# Patient Record
Sex: MALE | Race: WHITE | NOT HISPANIC OR LATINO | Employment: OTHER | URBAN - METROPOLITAN AREA
[De-identification: names, ages, dates, MRNs, and addresses within clinical notes are randomized per-mention and may not be internally consistent; named-entity substitution may affect disease eponyms.]

---

## 2018-02-22 ENCOUNTER — OFFICE VISIT (OUTPATIENT)
Dept: OPHTHALMOLOGY | Facility: CLINIC | Age: 68
End: 2018-02-22
Payer: MEDICARE

## 2018-02-22 DIAGNOSIS — H35.3132 INTERMEDIATE STAGE NONEXUDATIVE AGE-RELATED MACULAR DEGENERATION OF BOTH EYES: ICD-10-CM

## 2018-02-22 DIAGNOSIS — H25.12 NUCLEAR SCLEROSIS OF LEFT EYE: ICD-10-CM

## 2018-02-22 DIAGNOSIS — Z96.1 PSEUDOPHAKIA OF RIGHT EYE: Primary | ICD-10-CM

## 2018-02-22 DIAGNOSIS — H52.203 ASTIGMATISM OF BOTH EYES, UNSPECIFIED TYPE: ICD-10-CM

## 2018-02-22 PROCEDURE — 92134 CPTRZ OPH DX IMG PST SGM RTA: CPT | Mod: PBBFAC,PO | Performed by: OPHTHALMOLOGY

## 2018-02-22 PROCEDURE — 99999 PR PBB SHADOW E&M-NEW PATIENT-LVL I: CPT | Mod: PBBFAC,,, | Performed by: OPHTHALMOLOGY

## 2018-02-22 PROCEDURE — 92004 COMPRE OPH EXAM NEW PT 1/>: CPT | Mod: S$PBB,,, | Performed by: OPHTHALMOLOGY

## 2018-02-22 PROCEDURE — 99201 *HC E&M-NEW PATIENT-LVL I: CPT | Mod: PBBFAC,PO,25 | Performed by: OPHTHALMOLOGY

## 2018-02-22 RX ORDER — PRIMIDONE 50 MG/1
TABLET ORAL
COMMUNITY

## 2018-02-22 RX ORDER — METOPROLOL SUCCINATE 100 MG/1
100 TABLET, EXTENDED RELEASE ORAL DAILY
COMMUNITY
End: 2019-01-01

## 2018-02-22 RX ORDER — PAROXETINE 10 MG/5ML
SUSPENSION ORAL DAILY
COMMUNITY

## 2018-02-22 RX ORDER — MELOXICAM 15 MG/1
15 TABLET ORAL DAILY
COMMUNITY

## 2018-02-22 NOTE — PROGRESS NOTES
SUBJECTIVE:   Jackson Fitzpatrick is a 68 y.o. male   Uncorrected distance visual acuity was 20/40 in the right eye and 20/20 -2 in the left eye.   Chief Complaint   Patient presents with    Eye Problem        HPI:  HPI     Pt. Had cataract surgery OD 12/12/17 by Dr. Knight in [place].  He was   given a multifocal implant.  He has never seen well OD since.  He has   problems at all distances.  Can't see text on TV, problems with computer   monitor.    PCIOL OD  Cataract OS      Last edited by Zaira Brennan on 2/22/2018  2:53 PM. (History)        Assessment /Plan :  1. Pseudophakia of right eye  Pt is unhappy with visual result OD and feels he was seeing better before surgery. Also feels there is excessive haze and glare to vision OD. There is some residual astigmatism OD but when corrected still feels that vision is lacking. Fairly significant degree of AMD OU which may be contributory and I recommend consult with [facility] to see if any Rx advisable.  H/o MFIOL OD with Dr Pedro Chacon 3 months ago. IOL in bag and no capsular fibrosis- I advised pt against YAG at this time as IOL exchange may be desired in future if sx's persist. Pt also unhappy with near acuity.   2. Nuclear sclerosis of left eye mild and nonsurgical.   3. Astigmatism of both eyes, unspecified type    4.      AMD OU - consult JC

## 2018-03-06 ENCOUNTER — OFFICE VISIT (OUTPATIENT)
Dept: OPHTHALMOLOGY | Facility: CLINIC | Age: 68
End: 2018-03-06
Payer: MEDICARE

## 2018-03-06 DIAGNOSIS — H25.12 NUCLEAR SCLEROSIS OF LEFT EYE: ICD-10-CM

## 2018-03-06 DIAGNOSIS — H35.3132 INTERMEDIATE STAGE NONEXUDATIVE AGE-RELATED MACULAR DEGENERATION OF BOTH EYES: Primary | ICD-10-CM

## 2018-03-06 DIAGNOSIS — H52.223 REGULAR ASTIGMATISM OF BOTH EYES: ICD-10-CM

## 2018-03-06 DIAGNOSIS — Z96.1 PSEUDOPHAKIA OF RIGHT EYE: ICD-10-CM

## 2018-03-06 PROBLEM — H35.3192: Status: ACTIVE | Noted: 2018-02-22

## 2018-03-06 PROCEDURE — 99212 OFFICE O/P EST SF 10 MIN: CPT | Mod: PBBFAC,PO,25 | Performed by: OPHTHALMOLOGY

## 2018-03-06 PROCEDURE — 92250 FUNDUS PHOTOGRAPHY W/I&R: CPT | Mod: PBBFAC,PO | Performed by: OPHTHALMOLOGY

## 2018-03-06 PROCEDURE — 92014 COMPRE OPH EXAM EST PT 1/>: CPT | Mod: S$PBB,,, | Performed by: OPHTHALMOLOGY

## 2018-03-06 PROCEDURE — 99999 PR PBB SHADOW E&M-EST. PATIENT-LVL II: CPT | Mod: PBBFAC,,, | Performed by: OPHTHALMOLOGY

## 2018-03-06 NOTE — PROGRESS NOTES
"    ===============================  03/06/2018   Jackson Fitzpatrick,   68 y.o. male   Last visit [facility]: :Visit date not found   Last [place] [place]  [facility]:  Uncorrected distance visual acuity was 20/40 in the right eye and 20/25 in the left eye.  Tonometry     Tonometry (Applanation, 2/22/18)       Right Left    Pressure 9 17               Not recorded        Manifest Refraction     Manifest Refraction       Sphere Cylinder Axis    Right -0.25 +1.50 177    Left -0.75 +1.25 160    Comments:  Dr. Samy LANDAVERDE              Chief Complaint   Patient presents with    Macular Degeneration     ref by cpg pt states that since his cat sx in dec of 2017 his right eye vision has not improved        HPI     Macular Degeneration    Additional comments: ref by cpg pt states that since his cat sx in dec of   2017 his right eye vision has not improved           Comments   1. MFIOL PCIOL OD (Pedro Knight)  2. Cataract OS  3. Dry AMD OU       Last edited by NITIN Fuentes MD on 3/6/2018  1:55 PM. (History)          ________________  3/6/2018  Problem List Items Addressed This Visit        Eye/Vision problems    Pseudophakia of right eye    Nuclear sclerosis of left eye    Regular astigmatism of both eyes    Intermediate stage dry age-related macular degeneration - Primary    Relevant Orders    Color Fundus Photography - OU - Both Eyes (Completed)          .  States he was 20/30 and asymptomatic prior to CE  OD 3+ confluent Drusen and RPE mottling  OS miliary confluent drusen and macular splitting geographic atrophy    C/o ghost image intermittently   Could this be from the MF IOL?, or secondary to astigmatism     "I've never worn glasses"    Significant astigmatism, correction resolves ghost image  I recommend rx for glasses    Reasons for  Visual problems od    1Asteroid and displacement   2Multifocal after image  3smd ( this symmetric and symptoms od only)  4Refractive error      "should I have had this surgery"  Trial of " glasses  And time.  Have seen symptomatic asteroid after ce ...then better   no cat ext os  for now     Check red desaturation next visit  C/o color variation from one eye to another    Get new glasses then rtc in 6 weeks                 ===========================

## 2018-04-17 ENCOUNTER — OFFICE VISIT (OUTPATIENT)
Dept: OPHTHALMOLOGY | Facility: CLINIC | Age: 68
End: 2018-04-17
Payer: MEDICARE

## 2018-04-17 ENCOUNTER — TELEPHONE (OUTPATIENT)
Dept: OPHTHALMOLOGY | Facility: CLINIC | Age: 68
End: 2018-04-17

## 2018-04-17 DIAGNOSIS — H43.21 ASTEROID HYALOSIS OF RIGHT EYE: Primary | ICD-10-CM

## 2018-04-17 PROCEDURE — 92012 INTRM OPH EXAM EST PATIENT: CPT | Mod: S$PBB,,, | Performed by: OPHTHALMOLOGY

## 2018-04-17 PROCEDURE — 99999 PR PBB SHADOW E&M-EST. PATIENT-LVL II: CPT | Mod: PBBFAC,,, | Performed by: OPHTHALMOLOGY

## 2018-04-17 PROCEDURE — 99212 OFFICE O/P EST SF 10 MIN: CPT | Mod: PBBFAC,PO | Performed by: OPHTHALMOLOGY

## 2018-04-17 NOTE — TELEPHONE ENCOUNTER
----- Message from NITIN Fuentes MD sent at 4/17/2018  2:41 PM CDT -----  Please call Mr. Fitzpatrick to schedule consult, see my note.

## 2018-04-17 NOTE — PROGRESS NOTES
"    ===============================  04/17/2018   Jackson Fitzpatrick,   68 y.o. male   Last visit [facility]: :3/6/2018   Last visit [place] [facility] 3/6/[place]  [facility]:  Corrected distance visual acuity was 20/30 in the right eye and 20/25 in the left eye.  Tonometry     Tonometry (Applanation, 1:49 PM)       Right Left    Pressure 15 14              Wearing Rx     Wearing Rx       Sphere Cylinder Axis Add    Right -0.25 +1.50 177 +2.50    Left -0.75 +1.25 160 +2.50               Not recorded        Chief Complaint   Patient presents with    Macular Degeneration     1 MONTH AMD CHECK UP        HPI     Macular Degeneration    Additional comments: 1 MONTH AMD CHECK UP           Comments   1. MFIOL PCIOL OD (Pedro Chacon)  2. Cataract OS  3. Dry AMD OU       Last edited by Ama Casey on 4/17/2018  1:41 PM. 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  No help OD with new glasses per pt.    Optical distortion from target IOL?  Asteroid hyalosis OD only, symptomatic   But not main complaint  Main complaint "I just can't see detail anymore since cataract surgery"  I feel his symptoms are likely secondary to multifocal lens  Discussed with him, no easy solution, surgery to remove asteroid, but risky  Also, IOL exchange, high risk   I recommend no surgery  Patient really wants IOL exchange, came here wanting surgery  I recommend consult Dr. Clarke, what, if anything can be done to improve vision                 ===========================    "

## 2018-06-06 ENCOUNTER — OFFICE VISIT (OUTPATIENT)
Dept: OPHTHALMOLOGY | Facility: CLINIC | Age: 68
End: 2018-06-06
Payer: MEDICARE

## 2018-06-06 DIAGNOSIS — H35.3132 INTERMEDIATE STAGE NONEXUDATIVE AGE-RELATED MACULAR DEGENERATION OF BOTH EYES: Primary | ICD-10-CM

## 2018-06-06 DIAGNOSIS — H25.12 NUCLEAR SCLEROSIS OF LEFT EYE: ICD-10-CM

## 2018-06-06 PROCEDURE — 99999 PR PBB SHADOW E&M-EST. PATIENT-LVL II: CPT | Mod: PBBFAC,,, | Performed by: OPHTHALMOLOGY

## 2018-06-06 PROCEDURE — 92012 INTRM OPH EXAM EST PATIENT: CPT | Mod: S$PBB,,, | Performed by: OPHTHALMOLOGY

## 2018-06-06 PROCEDURE — 99212 OFFICE O/P EST SF 10 MIN: CPT | Mod: PBBFAC | Performed by: OPHTHALMOLOGY

## 2018-06-06 NOTE — PROGRESS NOTES
HPI     DLS: 4/17/18 with Dr. Fuentes;    Pt had cataract surgery with Dr. Chacon OD- 12/17. Pt was referred by Dr. Fuentes for an IOL exchange OD. Pt states he also has Asteroid Hyalosis OU   and it looks as though he is looking through Vaseline.  Pt states at times   he feels like theres trash in her eyes and at times feels like pressure   behind his eyes. Pt states he does wear glasses but it doesn't seem like   they were helping him much.     Meds: No GTTS        Last edited by Nancie Acuna on 6/6/2018  1:15 PM. (History)            Assessment /Plan     For exam results, see Encounter Report.    Intermediate stage nonexudative age-related macular degeneration of both eyes    Nuclear sclerosis of left eye      MFIOL with Dry AMD, residual cyl, and asteroid OD  Recommend try new MRx OU, before deciding for any surgery...

## 2018-08-10 DIAGNOSIS — M25.569 KNEE PAIN, UNSPECIFIED CHRONICITY, UNSPECIFIED LATERALITY: Primary | ICD-10-CM

## 2018-08-17 ENCOUNTER — HOSPITAL ENCOUNTER (OUTPATIENT)
Dept: RADIOLOGY | Facility: HOSPITAL | Age: 68
Discharge: HOME | End: 2018-08-17
Attending: ORTHOPAEDIC SURGERY
Payer: MEDICARE

## 2018-08-17 ENCOUNTER — OFFICE VISIT (OUTPATIENT)
Dept: ORTHOPEDICS | Facility: CLINIC | Age: 68
End: 2018-08-17
Payer: MEDICARE

## 2018-08-17 VITALS — HEIGHT: 72 IN | BODY MASS INDEX: 32.51 KG/M2 | WEIGHT: 240 LBS

## 2018-08-17 DIAGNOSIS — Z96.651 HISTORY OF RIGHT KNEE JOINT REPLACEMENT: ICD-10-CM

## 2018-08-17 DIAGNOSIS — M25.569 KNEE PAIN, UNSPECIFIED CHRONICITY, UNSPECIFIED LATERALITY: ICD-10-CM

## 2018-08-17 DIAGNOSIS — M25.561 ACUTE PAIN OF RIGHT KNEE: Primary | ICD-10-CM

## 2018-08-17 PROCEDURE — 73562 X-RAY EXAM OF KNEE 3: CPT | Mod: 26,50,, | Performed by: RADIOLOGY

## 2018-08-17 PROCEDURE — 99212 OFFICE O/P EST SF 10 MIN: CPT | Mod: PBBFAC,25,PN | Performed by: ORTHOPAEDIC SURGERY

## 2018-08-17 PROCEDURE — 73562 X-RAY EXAM OF KNEE 3: CPT | Mod: TC,50,PO

## 2018-08-17 PROCEDURE — 99203 OFFICE O/P NEW LOW 30 MIN: CPT | Mod: S$PBB,,, | Performed by: ORTHOPAEDIC SURGERY

## 2018-08-17 PROCEDURE — 99999 PR PBB SHADOW E&M-EST. PATIENT-LVL II: CPT | Mod: PBBFAC,,, | Performed by: ORTHOPAEDIC SURGERY

## 2018-08-17 NOTE — PROGRESS NOTES
HISTORY OF PRESENT ILLNESS:  Nanette is 68 years old who had a right-sided knee   replacement a couple of years ago done in [place], wanted to have it checked   out.  In the past couple of months, he has felt some stiffness in it.  Pain is   0/10 on good days, up to 8/10 on the pain scale on bad days.    PHYSICAL EXAMINATION:  Shows a well-healed scar, good motion, good strength.  No   instability.    X-rays show well-placed components.    ASSESSMENT:  Status post knee replacement a couple of years ago done elsewhere   with pain.    PLAN:  We will treat this with basic observation.  If things worsen, we will get   more aggressive with our workup.      PBB/HN  dd: 08/17/2018 09:35:05 (CDT)  td: 08/18/2018 00:37:35 (CDT)  Doc ID   #[id number]  Job ID #[id number]    CC:     Further History  Aching pain  Worse with activity  Relieved with rest  No other associated symptoms  No other radiation    Further Exam  Alert and oriented  Pleasant  Contralateral limb has appropriate range of motion for age and condition  Contralateral limb has appropriate strength for age and condition  Contralateral limb has appropriate stability  for age and condition  No adenopathy  Pulses are appropriate for current condition  Skin is intact        Chief Complaint    Chief Complaint   Patient presents with    Right Knee - Pain       HPI  Jackson MALI Fitzpatrick is a 68 y.o.  male who presents with       Past Medical History  Past Medical History:   Diagnosis Date    Arthritis     Cataract     Diabetes mellitus     Hypertension     Macular degeneration     Tremors of nervous system        Past Surgical History  Past Surgical History:   Procedure Laterality Date    APPENDECTOMY      CATARACT EXTRACTION      ELBOW SURGERY      FOOT SURGERY      TOTAL KNEE ARTHROPLASTY Right        Medications  Current Outpatient Medications   Medication Sig    meloxicam (MOBIC) 15 MG tablet Take 15 mg by mouth once daily.    metoprolol succinate (TOPROL-XL) 100 MG  24 hr tablet Take 100 mg by mouth once daily.    paroxetine (PAXIL) 10 mg/5 mL Susp Take by mouth once daily.    primidone (MYSOLINE) 50 MG Tab Take by mouth.     No current facility-administered medications for this visit.        Allergies  Review of patient's allergies indicates:  No Known Allergies    Family History  Family History   Problem Relation Age of Onset    No Known Problems Mother     No Known Problems Father     No Known Problems Sister     No Known Problems Brother     No Known Problems Maternal Aunt     No Known Problems Maternal Uncle     No Known Problems Paternal Aunt     No Known Problems Paternal Uncle     No Known Problems Maternal Grandmother     No Known Problems Maternal Grandfather     No Known Problems Paternal Grandmother     No Known Problems Paternal Grandfather     Amblyopia Neg Hx     Blindness Neg Hx     Cancer Neg Hx     Cataracts Neg Hx     Diabetes Neg Hx     Glaucoma Neg Hx     Hypertension Neg Hx     Macular degeneration Neg Hx     Retinal detachment Neg Hx     Strabismus Neg Hx     Stroke Neg Hx     Thyroid disease Neg Hx        Social History  Social History     Socioeconomic History    Marital status:      Spouse name: Not on file    Number of children: Not on file    Years of education: Not on file    Highest education level: Not on file   Social Needs    Financial resource strain: Not on file    Food insecurity - worry: Not on file    Food insecurity - inability: Not on file    Transportation needs - medical: Not on file    Transportation needs - non-medical: Not on file   Occupational History    Not on file   Tobacco Use    Smoking status: Current Every Day Smoker    Smokeless tobacco: Never Used   Substance and Sexual Activity    Alcohol use: No    Drug use: No    Sexual activity: Not on file   Other Topics Concern    Not on file   Social History Narrative    Not on file               Review of Systems     Constitutional:  Negative    HENT: Negative  Eyes: Negative  Respiratory: Negative  Cardiovascular: Negative  Musculoskeletal: HPI  Skin: Negative  Neurological: Negative  Hematological: Negative  Endocrine: Negative                 Physical Exam    There were no vitals filed for this visit.  Body mass index is 32.55 kg/m².  Physical Examination:     General appearance -  well appearing, and in no distress  Mental status - awake  Neck - supple  Chest -  symmetric air entry  Heart - normal rate   Abdomen - soft      Assessment     1. Acute pain of right knee    2. History of right knee joint replacement          Plan

## 2019-01-01 ENCOUNTER — TELEPHONE (OUTPATIENT)
Dept: VASCULAR SURGERY | Facility: CLINIC | Age: 69
End: 2019-01-01

## 2019-01-01 ENCOUNTER — OFFICE VISIT (OUTPATIENT)
Dept: CARDIAC SURGERY | Facility: CLINIC | Age: 69
End: 2019-01-01
Payer: MEDICARE

## 2019-01-01 VITALS
SYSTOLIC BLOOD PRESSURE: 148 MMHG | HEIGHT: 72 IN | BODY MASS INDEX: 32.56 KG/M2 | HEART RATE: 80 BPM | DIASTOLIC BLOOD PRESSURE: 92 MMHG | WEIGHT: 240.38 LBS

## 2019-01-01 DIAGNOSIS — I71.40 ABDOMINAL AORTIC ANEURYSM (AAA) WITHOUT RUPTURE: ICD-10-CM

## 2019-01-01 PROCEDURE — 1101F PR PT FALLS ASSESS DOC 0-1 FALLS W/OUT INJ PAST YR: ICD-10-PCS | Mod: CPTII,S$GLB,, | Performed by: THORACIC SURGERY (CARDIOTHORACIC VASCULAR SURGERY)

## 2019-01-01 PROCEDURE — 1101F PT FALLS ASSESS-DOCD LE1/YR: CPT | Mod: CPTII,S$GLB,, | Performed by: THORACIC SURGERY (CARDIOTHORACIC VASCULAR SURGERY)

## 2019-01-01 PROCEDURE — 1159F PR MEDICATION LIST DOCUMENTED IN MEDICAL RECORD: ICD-10-PCS | Mod: S$GLB,,, | Performed by: THORACIC SURGERY (CARDIOTHORACIC VASCULAR SURGERY)

## 2019-01-01 PROCEDURE — 1159F MED LIST DOCD IN RCRD: CPT | Mod: S$GLB,,, | Performed by: THORACIC SURGERY (CARDIOTHORACIC VASCULAR SURGERY)

## 2019-01-01 PROCEDURE — 1125F AMNT PAIN NOTED PAIN PRSNT: CPT | Mod: S$GLB,,, | Performed by: THORACIC SURGERY (CARDIOTHORACIC VASCULAR SURGERY)

## 2019-01-01 PROCEDURE — 1125F PR PAIN SEVERITY QUANTIFIED, PAIN PRESENT: ICD-10-PCS | Mod: S$GLB,,, | Performed by: THORACIC SURGERY (CARDIOTHORACIC VASCULAR SURGERY)

## 2019-01-01 PROCEDURE — 99999 PR PBB SHADOW E&M-EST. PATIENT-LVL III: CPT | Mod: PBBFAC,,, | Performed by: THORACIC SURGERY (CARDIOTHORACIC VASCULAR SURGERY)

## 2019-01-01 PROCEDURE — 99999 PR PBB SHADOW E&M-EST. PATIENT-LVL III: ICD-10-PCS | Mod: PBBFAC,,, | Performed by: THORACIC SURGERY (CARDIOTHORACIC VASCULAR SURGERY)

## 2019-01-01 PROCEDURE — 99203 PR OFFICE/OUTPT VISIT, NEW, LEVL III, 30-44 MIN: ICD-10-PCS | Mod: S$GLB,,, | Performed by: THORACIC SURGERY (CARDIOTHORACIC VASCULAR SURGERY)

## 2019-01-01 PROCEDURE — 99203 OFFICE O/P NEW LOW 30 MIN: CPT | Mod: S$GLB,,, | Performed by: THORACIC SURGERY (CARDIOTHORACIC VASCULAR SURGERY)

## 2019-01-01 RX ORDER — NAPROXEN SODIUM 220 MG/1
81 TABLET, FILM COATED ORAL DAILY
COMMUNITY

## 2019-01-01 RX ORDER — BUPROPION HYDROCHLORIDE 150 MG/1
TABLET ORAL
COMMUNITY
Start: 2019-01-01

## 2019-01-01 RX ORDER — METOPROLOL SUCCINATE 50 MG/1
TABLET, EXTENDED RELEASE ORAL
COMMUNITY
Start: 2019-01-01

## 2019-05-21 ENCOUNTER — TELEPHONE (OUTPATIENT)
Dept: ORTHOPEDICS | Facility: CLINIC | Age: 69
End: 2019-05-21

## 2019-05-21 NOTE — TELEPHONE ENCOUNTER
Talked with patient and he is going to come in the morning and they would like to come at 1045 in the morning. Patient went to urgent care and has dressing on wrist at this time.

## 2019-05-22 ENCOUNTER — HOSPITAL ENCOUNTER (OUTPATIENT)
Dept: RADIOLOGY | Facility: HOSPITAL | Age: 69
Discharge: HOME | End: 2019-05-22
Attending: ORTHOPAEDIC SURGERY
Payer: MEDICARE

## 2019-05-22 ENCOUNTER — OFFICE VISIT (OUTPATIENT)
Dept: ORTHOPEDICS | Facility: CLINIC | Age: 69
End: 2019-05-22
Payer: MEDICARE

## 2019-05-22 VITALS — WEIGHT: 240.06 LBS | HEIGHT: 72 IN | BODY MASS INDEX: 32.52 KG/M2

## 2019-05-22 DIAGNOSIS — M25.532 LEFT WRIST PAIN: ICD-10-CM

## 2019-05-22 DIAGNOSIS — R07.81 RIB PAIN ON LEFT SIDE: ICD-10-CM

## 2019-05-22 DIAGNOSIS — S69.92XA INJURY OF LEFT WRIST, INITIAL ENCOUNTER: Primary | ICD-10-CM

## 2019-05-22 DIAGNOSIS — S69.92XA INJURY OF LEFT WRIST, INITIAL ENCOUNTER: ICD-10-CM

## 2019-05-22 DIAGNOSIS — S52.502A CLOSED FRACTURE OF DISTAL END OF LEFT RADIUS, UNSPECIFIED FRACTURE MORPHOLOGY, INITIAL ENCOUNTER: ICD-10-CM

## 2019-05-22 PROCEDURE — 71100 X-RAY EXAM RIBS UNI 2 VIEWS: CPT | Mod: TC,PO,LT

## 2019-05-22 PROCEDURE — 73110 XR WRIST COMPLETE 3 VIEWS LEFT: ICD-10-PCS | Mod: 26,LT,, | Performed by: RADIOLOGY

## 2019-05-22 PROCEDURE — 25600 PR CLOSED RX DIST RAD/ULNA FX: ICD-10-PCS | Mod: LT,S$GLB,, | Performed by: ORTHOPAEDIC SURGERY

## 2019-05-22 PROCEDURE — 1101F PR PT FALLS ASSESS DOC 0-1 FALLS W/OUT INJ PAST YR: ICD-10-PCS | Mod: CPTII,S$GLB,, | Performed by: ORTHOPAEDIC SURGERY

## 2019-05-22 PROCEDURE — 1101F PT FALLS ASSESS-DOCD LE1/YR: CPT | Mod: CPTII,S$GLB,, | Performed by: ORTHOPAEDIC SURGERY

## 2019-05-22 PROCEDURE — 71100 X-RAY EXAM RIBS UNI 2 VIEWS: CPT | Mod: 26,LT,, | Performed by: RADIOLOGY

## 2019-05-22 PROCEDURE — 71100 XR RIBS 2 VIEW LEFT: ICD-10-PCS | Mod: 26,LT,, | Performed by: RADIOLOGY

## 2019-05-22 PROCEDURE — 99999 PR PBB SHADOW E&M-EST. PATIENT-LVL II: CPT | Mod: PBBFAC,,, | Performed by: ORTHOPAEDIC SURGERY

## 2019-05-22 PROCEDURE — 99999 PR PBB SHADOW E&M-EST. PATIENT-LVL II: ICD-10-PCS | Mod: PBBFAC,,, | Performed by: ORTHOPAEDIC SURGERY

## 2019-05-22 PROCEDURE — 99214 PR OFFICE/OUTPT VISIT, EST, LEVL IV, 30-39 MIN: ICD-10-PCS | Mod: 57,S$GLB,, | Performed by: ORTHOPAEDIC SURGERY

## 2019-05-22 PROCEDURE — 25600 CLTX DST RDL FX/EPHYS SEP WO: CPT | Mod: LT,S$GLB,, | Performed by: ORTHOPAEDIC SURGERY

## 2019-05-22 PROCEDURE — 73110 X-RAY EXAM OF WRIST: CPT | Mod: 26,LT,, | Performed by: RADIOLOGY

## 2019-05-22 PROCEDURE — 99214 OFFICE O/P EST MOD 30 MIN: CPT | Mod: 57,S$GLB,, | Performed by: ORTHOPAEDIC SURGERY

## 2019-05-22 PROCEDURE — 73110 X-RAY EXAM OF WRIST: CPT | Mod: TC,PO,LT

## 2019-05-22 RX ORDER — ATORVASTATIN CALCIUM 20 MG/1
TABLET, FILM COATED ORAL
COMMUNITY
Start: 2019-04-05

## 2019-05-22 RX ORDER — TRAMADOL HYDROCHLORIDE 50 MG/1
TABLET ORAL
COMMUNITY
Start: 2019-05-21

## 2019-05-22 NOTE — PROGRESS NOTES
HISTORY OF PRESENT ILLNESS:  A 69 year old, had a fall two days ago, landed on   his left wrist, and left wrist has been hurting since then.  Comes here today   for followup.    PHYSICAL EXAMINATION:  Today shows he is tender at the distal radius.  Skin is   intact.  Compartments are soft.  Also he is tender along the lateral ribcage on   the left side.    X-rays show left distal radial fracture.    ASSESSMENT:  Left distal radial fracture, probable rib fracture as well.    PLAN:  Wrist and forearm immobilizer.  Follow up in a couple of weeks' time as a   postoperative visit with x-rays of his left wrist and his left ribs.          PBB/HN  dd: 05/22/2019 11:57:47 (CDT)  td: 05/22/2019 23:16:10 (CDT)  Doc ID   #[id number]  Job ID #[id number]    CC:     Further History  Aching pain  Worse with activity  Relieved with rest  No other associated symptoms  No other radiation    Further Exam  Alert and oriented  Pleasant  Contralateral limb has appropriate range of motion for age and condition  Contralateral limb has appropriate strength for age and condition  Contralateral limb has appropriate stability  for age and condition  No adenopathy  Pulses are appropriate for current condition  Skin is intact        Chief Complaint    Chief Complaint   Patient presents with    Left Wrist - Injury       HPI  Jackson Fitzpatrick is a 69 y.o.  male who presents with       Past Medical History  Past Medical History:   Diagnosis Date    Arthritis     Cataract     Diabetes mellitus     Hypertension     Macular degeneration     Tremors of nervous system        Past Surgical History  Past Surgical History:   Procedure Laterality Date    APPENDECTOMY      CATARACT EXTRACTION      ELBOW SURGERY      FOOT SURGERY      TOTAL KNEE ARTHROPLASTY Right        Medications  Current Outpatient Medications   Medication Sig    atorvastatin (LIPITOR) 20 MG tablet     meloxicam (MOBIC) 15 MG tablet Take 15 mg by mouth once daily.    metoprolol  succinate (TOPROL-XL) 100 MG 24 hr tablet Take 100 mg by mouth once daily.    paroxetine (PAXIL) 10 mg/5 mL Susp Take by mouth once daily.    primidone (MYSOLINE) 50 MG Tab Take by mouth.    traMADol (ULTRAM) 50 mg tablet      No current facility-administered medications for this visit.        Allergies  Review of patient's allergies indicates:  No Known Allergies    Family History  Family History   Problem Relation Age of Onset    No Known Problems Mother     No Known Problems Father     No Known Problems Sister     No Known Problems Brother     No Known Problems Maternal Aunt     No Known Problems Maternal Uncle     No Known Problems Paternal Aunt     No Known Problems Paternal Uncle     No Known Problems Maternal Grandmother     No Known Problems Maternal Grandfather     No Known Problems Paternal Grandmother     No Known Problems Paternal Grandfather     Amblyopia Neg Hx     Blindness Neg Hx     Cancer Neg Hx     Cataracts Neg Hx     Diabetes Neg Hx     Glaucoma Neg Hx     Hypertension Neg Hx     Macular degeneration Neg Hx     Retinal detachment Neg Hx     Strabismus Neg Hx     Stroke Neg Hx     Thyroid disease Neg Hx        Social History  Social History     Socioeconomic History    Marital status:      Spouse name: Not on file    Number of children: Not on file    Years of education: Not on file    Highest education level: Not on file   Occupational History    Not on file   Social Needs    Financial resource strain: Not on file    Food insecurity:     Worry: Not on file     Inability: Not on file    Transportation needs:     Medical: Not on file     Non-medical: Not on file   Tobacco Use    Smoking status: Current Every Day Smoker    Smokeless tobacco: Never Used   Substance and Sexual Activity    Alcohol use: No    Drug use: No    Sexual activity: Not on file   Lifestyle    Physical activity:     Days per week: Not on file     Minutes per session: Not on file     Stress: Not on file   Relationships    Social connections:     Talks on phone: Not on file     Gets together: Not on file     Attends Alevism service: Not on file     Active member of club or organization: Not on file     Attends meetings of clubs or organizations: Not on file     Relationship status: Not on file   Other Topics Concern    Not on file   Social History Narrative    Not on file               Review of Systems     Constitutional: Negative    HENT: Negative  Eyes: Negative  Respiratory: Negative  Cardiovascular: Negative  Musculoskeletal: HPI  Skin: Negative  Neurological: Negative  Hematological: Negative  Endocrine: Negative                 Physical Exam    There were no vitals filed for this visit.  Body mass index is 32.56 kg/m².  Physical Examination:     General appearance -  well appearing, and in no distress  Mental status - awake  Neck - supple  Chest -  symmetric air entry  Heart - normal rate   Abdomen - soft      Assessment     1. Injury of left wrist, initial encounter    2. Left wrist pain    3. Closed fracture of distal end of left radius, unspecified fracture morphology, initial encounter        We performed a custom orthotic/brace fitting, adjusting and training with the patient. The patient demonstrated understanding and proper care. This was performed for 15 minutes.      Plan

## 2019-11-06 NOTE — TELEPHONE ENCOUNTER
----- Message from Alison Florence sent at 11/6/2019  1:04 PM CST -----  Contact: Wife, Carlee Bejarano want to make sure you call her back today left message earlier please call back at [phone] (home) or [phone]

## 2019-11-06 NOTE — TELEPHONE ENCOUNTER
----- Message from Joao Gil sent at 11/6/2019  9:58 AM CST -----  Type: Needs Medical Advice    Who Called:  Carlee Martinsuvin (Spouse)  Symptoms (please be specific):  Abdominal Aortic anyurism  How long has patient had these symptoms:  Diagnosed 11/04    Best Call Back Number: [phone]  Additional Information: Caller states that she would like a callback from Tanja regarding scheduling the patient.  Please call to advise.  Referred by Dr. Medina

## 2019-12-12 PROBLEM — I71.40 ABDOMINAL AORTIC ANEURYSM (AAA) WITHOUT RUPTURE: Status: ACTIVE | Noted: 2019-01-01

## 2019-12-12 NOTE — LETTER
December 12, 2019      Luigi Adrian MD  [address]  [address]  [place] [address]           [facility] Cardiovascular Surgery  [address] [facility]  [place] [address]  Phone: [phone]  Fax: [phone]          Patient: Jackson Fitzpatrick   MR Number: [id number]   YOB: 1950   Date of Visit: 12/12/2019       Dear Dr. Luigi Adrian:    Thank you for referring Jackson Fitzpatrick to me for evaluation. Attached you will find relevant portions of my assessment and plan of care.    If you have questions, please do not hesitate to call me. I look forward to following Jackson Fitpzatrick along with you.    Sincerely,    Mayo Ludwig MD    Enclosure  CC:  No Recipients    If you would like to receive this communication electronically, please contact [email or web address] or [phone] to request more information on [employer] Link access.    For providers and/or their staff who would like to refer a patient to [facility], please contact us through our one-stop-shop provider referral line, [facility], at [phone].    If you feel you have received this communication in error or would no longer like to receive these types of communications, please e-mail [email or web address]

## 2019-12-12 NOTE — PROGRESS NOTES
This patient was referred with an abdominal aortic aneurysm.  An MRI was done that showed the aneurysm was approximately 5 cm in greatest transverse dimension.  He has had an ultrasound showing a smaller dimension.  He is asymptomatic.  He also has left leg and back pain and there was concern that he could potentially have claudication as well.  He has a history of smoking but quit within the last 2 months.  He denies any significant coronary artery disease.  He has borderline diabetes and hypertension.  He has no other pertinent family social history.  His surgical history is unremarkable.    On exam today vital signs are stable.  Pupils are equal and round and reactive to light.  Neck is supple.    Chest is clear to auscultation.    Heart is in a regular rate and rhythm.  Abdomen is benign.    Perfusion to the legs and feet seems to be satisfactory.  He has palpable pedal pulses.   Given the discrepancy between the ultrasound and MRI, a CT scan of the aorta with runoff will be obtained.  Based on this further recommendations can be made.

## 2020-01-01 ENCOUNTER — TELEPHONE (OUTPATIENT)
Dept: VASCULAR SURGERY | Facility: CLINIC | Age: 70
End: 2020-01-01

## 2020-08-25 NOTE — TELEPHONE ENCOUNTER
----- Message from Valeria Barboza sent at 8/25/2020  3:23 PM CDT -----  Contact: patient  Type: Needs Medical Advice  Who Called:  patient's wife Zara  Symptoms (please be specific):    How long has patient had these symptoms:    Pharmacy name and phone #:    Best Call Back Number: [phone]  Additional Information: requesting a call back to schedule appt couldn't schedule for renea in [facility]

## 2021-05-04 ENCOUNTER — PATIENT MESSAGE (OUTPATIENT)
Dept: RESEARCH | Facility: HOSPITAL | Age: 71
End: 2021-05-04

## 2022-07-22 NOTE — TELEPHONE ENCOUNTER
----- Message from Dougie Gordon sent at 11/18/2019 11:06 AM CST -----  Contact: wife Linda [phone]  Patient wife states that the doctor needs clinic notes faxed to [phone] [facility]. Please Advise.     Ladan Hassan)  Obstetrics and Gynecology  [address] [place] [address]  Phone: [phone]  Fax: [phone]  Established Patient  Follow Up Time: